# Patient Record
Sex: MALE | Race: OTHER | Employment: FULL TIME | URBAN - METROPOLITAN AREA
[De-identification: names, ages, dates, MRNs, and addresses within clinical notes are randomized per-mention and may not be internally consistent; named-entity substitution may affect disease eponyms.]

---

## 2017-11-23 ENCOUNTER — HOSPITAL ENCOUNTER (EMERGENCY)
Age: 42
Discharge: HOME | End: 2017-11-23
Attending: EMERGENCY MEDICINE
Payer: COMMERCIAL

## 2017-11-23 VITALS
HEART RATE: 78 BPM | DIASTOLIC BLOOD PRESSURE: 109 MMHG | BODY MASS INDEX: 27.09 KG/M2 | RESPIRATION RATE: 16 BRPM | SYSTOLIC BLOOD PRESSURE: 167 MMHG | OXYGEN SATURATION: 98 % | WEIGHT: 200 LBS | HEIGHT: 72 IN | TEMPERATURE: 97.8 F

## 2017-11-23 DIAGNOSIS — H10.32 ACUTE CONJUNCTIVITIS OF LEFT EYE, UNSPECIFIED ACUTE CONJUNCTIVITIS TYPE: Primary | ICD-10-CM

## 2017-11-23 PROCEDURE — 99282 EMERGENCY DEPT VISIT SF MDM: CPT | Performed by: PHYSICIAN ASSISTANT

## 2017-11-23 RX ORDER — POLYMYXIN B SULFATE AND TRIMETHOPRIM 1; 10000 MG/ML; [USP'U]/ML
1 SOLUTION OPHTHALMIC EVERY 4 HOURS
Qty: 10 ML | Refills: 0 | Status: SHIPPED | OUTPATIENT
Start: 2017-11-23

## 2017-11-23 NOTE — ED PROVIDER NOTES
Patient is a 43 y.o. male presenting with eye pain. The history is provided by the patient. Eye Pain    This is a new problem. The current episode started more than 2 days ago. The problem occurs constantly. The problem has been gradually improving. The left eye is affected. The injury mechanism was none. The pain is at a severity of 8/10. The pain is mild. There is no history of trauma to the eye. There is no known exposure to pink eye. He does not wear contacts. Associated symptoms include eye redness, itching and pain. Pertinent negatives include no blurred vision, no discharge, no vomiting, no fever and no blindness. He has tried nothing for the symptoms. The treatment provided no relief. Past Medical History:   Diagnosis Date    Asthma        History reviewed. No pertinent surgical history. History reviewed. No pertinent family history. Social History     Social History    Marital status: SINGLE     Spouse name: N/A    Number of children: N/A    Years of education: N/A     Occupational History    Not on file. Social History Main Topics    Smoking status: Never Smoker    Smokeless tobacco: Never Used    Alcohol use Yes      Comment: daily beer    Drug use: No    Sexual activity: Not on file     Other Topics Concern    Not on file     Social History Narrative    No narrative on file         ALLERGIES: Review of patient's allergies indicates no known allergies. Review of Systems   Constitutional: Negative for fever. Eyes: Positive for pain and redness. Negative for blindness, blurred vision and discharge. Gastrointestinal: Negative for vomiting. Skin: Positive for itching. All other systems reviewed and are negative. Vitals:    11/23/17 1250   BP: (!) 167/109   Pulse: 78   Resp: 16   Temp: 97.8 °F (36.6 °C)   SpO2: 98%   Weight: 90.7 kg (200 lb)   Height: 6' (1.829 m)            Physical Exam   Constitutional: He is oriented to person, place, and time.  He appears well-developed and well-nourished. No distress. HENT:   Head: Normocephalic and atraumatic. Eyes: EOM are normal. Pupils are equal, round, and reactive to light. Left eye with red conjunctiva, no obvious drainage, rt normal at this time, pt denies visual changes    Neck: Normal range of motion. Neck supple. Cardiovascular: Normal rate and regular rhythm. Pulmonary/Chest: Effort normal and breath sounds normal. No respiratory distress. He has no wheezes. Abdominal: Soft. Bowel sounds are normal.   Musculoskeletal: He exhibits no edema. Neurological: He is alert and oriented to person, place, and time. Skin: Skin is warm. Nursing note and vitals reviewed.        MDM  Number of Diagnoses or Management Options  Diagnosis management comments: Conjunctivits, will treat        Amount and/or Complexity of Data Reviewed  Review and summarize past medical records: yes    Risk of Complications, Morbidity, and/or Mortality  Presenting problems: low  Diagnostic procedures: low  Management options: low    Patient Progress  Patient progress: improved    ED Course       Procedures

## 2017-11-23 NOTE — ED NOTES
I have reviewed discharge instructions with the patient. The patient verbalized understanding. Patient left ED via Discharge Method: ambulatory to Home with self. Opportunity for questions and clarification provided. Patient given 1 scripts. To continue your aftercare when you leave the hospital, you may receive an automated call from our care team to check in on how you are doing. This is a free service and part of our promise to provide the best care and service to meet your aftercare needs.  If you have questions, or wish to unsubscribe from this service please call [phone]. Thank you for Choosing our [facility] Emergency Department.

## 2017-11-23 NOTE — ED TRIAGE NOTES
Patient advises left eye pain starting 3 days ago, advises redness and tearing has gotten worse. Patient advises light causes it to hurt more. Patient dopes not recall any injury to eye.

## 2017-11-23 NOTE — DISCHARGE INSTRUCTIONS
Conjuntivitis: Instrucciones de cuidado - [ Pinkeye: Care Instructions ]  Instrucciones de cuidado    La conjuntivitis es el enrojecimiento y la hinchazón de la superficie del oscar y de la conjuntiva (el recubrimiento del párpado y de la parte zackary del oscar). La conjuntivitis suele ser causada por ludivina infección bacteriana o viral. El aire seco, las [place] humo y las sustancias químicas son otras causas comunes. La conjuntivitis suele sanar por sí mark al cabo de 7 a 10 días. Los antibióticos solo ayudan si la conjuntivitis está causada por bacterias. La conjuntivitis causada por ludivina infección se propaga fácilmente. Si ludivina alergia o sustancia química es la causa de la conjuntivitis, esta no desaparecerá a menos que usted evite lo que la esté causando. La atención de seguimiento es ludivina parte clave de blakely tratamiento y seguridad. Asegúrese de hacer y acudir a todas las citas, y llame a blakely médico si está teniendo problemas. También es ludivina buena idea saber los resultados de los exámenes y mantener ludivina lista de los medicamentos que mikael. ¿Cómo puede cuidarse en el hogar? · Lávese las steffany con frecuencia. Siempre láveselas antes y después de tratarse la conjuntivitis o de tocarse los ojos o la michael. · Utilice un algodón húmedo o un paño limpio y húmedo para retirar las costras. Limpie desde la esquina interior del oscar hacia afuera. Use ludivina parte limpia del paño para cada pasada. · Colóquese paños húmedos, fríos o tibios, sobre el oscar unas cuantas veces al día si el oscar le duele. · No use lentes de contacto ni maquillaje para los ojos hasta que la conjuntivitis haya desaparecido. Deseche todo el maquillaje para ojos que usaba cuando comenzó la conjuntivitis. Limpie ailyn lentes de contacto y blakely estuche. Si [place] lentes de contacto desechables, use un par nuevo cuando el oscar haya sanado y sea seguro volver a usar lentes de contacto.   · Si el médico le recetó [employer] o gotas antibióticas para los ojos, [address] [facility] según las indicaciones. Use el medicamento todo el tiempo indicado, aunque el oscar comience a verse mejor en poco tiempo. Mantenga limpia la punta del frasco y no permita que la misma toque la bryan del oscar. · Para ponerse gotas para los ojos o pomada:  ¨ Incline la [place] atrás y lleve el párpado inferior hacia abajo con un dedo. ¨ Deje caer unas gotas o un chorrito del medicamento dentro del párpado inferior. ¨ Cierre el oscar por entre 30 y 61 segundos para permitir que las gotas o la pomada se esparzan. ¨ No permita que la punta del gotero o del tubo de pomada toque las pestañas ni ninguna otra superficie. · No comparta toallas de baño, almohadas ni toallitas para la michael mientras tenga conjuntivitis. ¿Cuándo debe pedir ayuda? Llame a blakely médico ahora mismo o busque atención médica inmediata si:  ? · Tiene dolor en el oscar, no solo irritación en la superficie. ? · Tiene algún cambio en la vista o pérdida de la visión. ? · Tiene mayor secreción del ocsar. ? · El oscar no ha comenzado a mejorar o empeora dentro de las 50 horas después de empezar a usar antibióticos. ? · La conjuntivitis dura más de 7 días. ?Preste especial atención a los cambios en blakely vera y asegúrese de comunicarse con blakely médico si tiene algún problema. ¿Dónde puede encontrar más información en inglés? Gerda Kaur a [email or web address]-[email or web address].[email or web address] Kosta Goon [id number] en la búsqueda para aprender más acerca de \"Conjuntivitis: Instrucciones de cuidado - [ [place]: Care Instructions ]. \"  Revisado: 20 Tu Jones 2017  Versión del contenido: 11.4  © 0065-8465 [employer]. Las instrucciones de cuidado fueron adaptadas bajo licencia por [employer] (which disclaims liability or warranty for this information). Si usted tiene [place] afección médica o sobre estas instrucciones, siempre pregunte a blakely profesional de vera.  [employer] niega toda garantía o responsabilidad por blakely uso de esta información.